# Patient Record
Sex: FEMALE | Race: WHITE | URBAN - METROPOLITAN AREA
[De-identification: names, ages, dates, MRNs, and addresses within clinical notes are randomized per-mention and may not be internally consistent; named-entity substitution may affect disease eponyms.]

---

## 2017-01-11 ENCOUNTER — OFFICE VISIT (OUTPATIENT)
Dept: INTERNAL MEDICINE | Facility: CLINIC | Age: 27
End: 2017-01-11
Payer: COMMERCIAL

## 2017-01-11 VITALS
DIASTOLIC BLOOD PRESSURE: 60 MMHG | HEIGHT: 64 IN | BODY MASS INDEX: 20.38 KG/M2 | OXYGEN SATURATION: 98 % | SYSTOLIC BLOOD PRESSURE: 100 MMHG | HEART RATE: 74 BPM | TEMPERATURE: 97.7 F | WEIGHT: 119.4 LBS

## 2017-01-11 DIAGNOSIS — G47.00 INSOMNIA, UNSPECIFIED TYPE: ICD-10-CM

## 2017-01-11 DIAGNOSIS — F41.9 ANXIETY: Primary | ICD-10-CM

## 2017-01-11 DIAGNOSIS — E03.9 HYPOTHYROIDISM, UNSPECIFIED TYPE: ICD-10-CM

## 2017-01-11 PROCEDURE — 99213 OFFICE O/P EST LOW 20 MIN: CPT | Performed by: INTERNAL MEDICINE

## 2017-01-11 RX ORDER — ESCITALOPRAM OXALATE 20 MG/1
20 TABLET ORAL DAILY
Qty: 90 TABLET | Refills: 3 | Status: CANCELLED | OUTPATIENT
Start: 2017-01-11

## 2017-01-11 RX ORDER — TRAZODONE HYDROCHLORIDE 50 MG/1
50-100 TABLET, FILM COATED ORAL
Qty: 60 TABLET | Refills: 3 | Status: SHIPPED | OUTPATIENT
Start: 2017-01-11 | End: 2017-06-27

## 2017-01-11 RX ORDER — ESCITALOPRAM OXALATE 20 MG/1
20 TABLET ORAL DAILY
Qty: 90 TABLET | Refills: 3 | Status: SHIPPED | OUTPATIENT
Start: 2017-01-11

## 2017-01-11 NOTE — PATIENT INSTRUCTIONS
Refills sent to pharmacy.    Thyroid labs when you've been on medication consistently for 1 month at least.

## 2017-01-11 NOTE — LETTER
[address] [address]  [phone]        January 16, 2018    Britt Navarro  [address]  [facility] [address]              Dear Britt Navarro    This is to remind you that your non-fasting labs is due.    You may call our office at [phone] to schedule an appointment.    Please disregard this notice if you have already had your labs drawn or made an appointment.        Sincerely,        Leilani Chavira MD

## 2017-01-11 NOTE — MR AVS SNAPSHOT
After Visit Summary   1/11/2017    Britt Navarro    MRN: [id number]           Patient Information     Date Of Birth          1990        Visit Information        Provider Department      1/11/2017 11:45 AM Leilani Chavira MD [facility]        Today's Diagnoses     Anxiety    -  1     Insomnia, unspecified type         Hypothyroidism, unspecified type           Care Instructions    Refills sent to pharmacy.    Thyroid labs when you've been on medication consistently for 1 month at least.         Follow-ups after your visit        Future tests that were ordered for you today     Open Future Orders        Priority Expected Expires Ordered    TSH Routine  1/11/2018 1/11/2017    T4 free Routine  1/11/2018 1/11/2017            Who to contact     If you have questions or need follow up information about today's clinic visit or your schedule please contact [facility] directly at [phone].  Normal or non-critical lab and imaging results will be communicated to you by MyChart, letter or phone within 4 business days after the clinic has received the results. If you do not hear from us within 7 days, please contact the clinic through [employer] or phone. If you have a critical or abnormal lab result, we will notify you by phone as soon as possible.  Submit refill requests through [employer] or call your pharmacy and they will forward the refill request to us. Please allow 3 business days for your refill to be completed.          Additional Information About Your Visit        MyChart Information     [employer] gives you secure access to your electronic health record. If you see a primary care provider, you can also send messages to your care team and make appointments. If you have questions, please call your primary care clinic.  If you do not have a primary care provider, please call [phone] and they will assist you.        Care EveryWhere ID      "This is your Care EveryWhere ID. This could be used by other organizations to access your [place] medical records  [id number]        Your Vitals Were     Pulse Temperature Height    74 97.7  F (36.5  C) (Oral) 5' 4\" (1.626 m)    BMI (Body Mass Index) Pulse Oximetry Last Period    20.48 kg/m2 98% 12/18/2016 (Exact Date)    Breastfeeding?          No         Blood Pressure from Last 3 Encounters:   01/11/17 100/60   10/05/16 118/60   08/17/16 110/70    Weight from Last 3 Encounters:   01/11/17 119 lb 6.4 oz (54.159 kg)   10/05/16 132 lb (59.875 kg)   08/17/16 136 lb 1.6 oz (61.735 kg)                 Today's Medication Changes          These changes are accurate as of: 1/11/17 12:00 PM.  If you have any questions, ask your nurse or doctor.               These medicines have changed or have updated prescriptions.        Dose/Directions    traZODone 50 MG tablet   Commonly known as:  DESYREL   This may have changed:  how much to take   Used for:  Insomnia, unspecified type   Changed by:  Leilani Chavira MD        Dose:   mg   Take 1-2 tablets ( mg) by mouth nightly as needed for sleep   Quantity:  60 tablet   Refills:  3            Where to get your medicines      These medications were sent to [facility] [address]  [address] [place] [address]     Phone:  [phone]    - escitalopram 20 MG tablet  - traZODone 50 MG tablet             Primary Care Provider Office Phone # Fax #    Leilani Chavira [phone]       [facility] [address]  [facility] [address]        Thank you!     Thank you for choosing [facility]  for your care. Our goal is always to provide you with excellent care. Hearing back from our patients is one way we can continue to improve our services. Please take a few minutes to complete the written survey that you may receive in the mail after your visit with us. Thank " you!             Your Updated Medication List - Protect others around you: Learn how to safely use, store and throw away your medicines at www.disposemymeds.org.          This list is accurate as of: 1/11/17 12:00 PM.  Always use your most recent med list.                   Brand Name Dispense Instructions for use    escitalopram 20 MG tablet    LEXAPRO    90 tablet    Take 1 tablet (20 mg) by mouth daily       LEVOTHYROXINE SODIUM PO      Take by mouth daily       traZODone 50 MG tablet    DESYREL    60 tablet    Take 1-2 tablets ( mg) by mouth nightly as needed for sleep       TRI-SPRINTEC 0.18/0.215/0.25 MG-35 MCG per tablet   Generic drug:  norgestim-eth estrad triphasic      Take 1 tablet by mouth daily

## 2017-01-11 NOTE — PROGRESS NOTES
"  SUBJECTIVE:                                                      HPI: Britt Navarro is a pleasant 26 year old female who presents for follow-up of anxiety and insomnia:    Re: anxiety:  - Lexapro dose was increased from 10 to 20mg daily at last visit ~5 weeks ago  - reports significant improvement since increase:   - able to handle stressors much better   - feeling much calmer, in general   - doing better, in general  - thankful for being on medication    Re: insomnia:  - has been using 1-2 tabs qhs PRN for insomnia  - has been using more nights than not  - works very well  - no excess grogginess    Needs refills of both medications.    Also due for thyroid follow-up:    HYPOTHYROID FOLLOW-UP                                                      Adherent to regimen: yes (but did run out for a couple weeks, a couple of weeks ago - prescription has since been renewed)  Taking on an empty stomach with sip of water - do food, drink, supplements, or medications for at least 45 minutes: yes    Cold or heat intolerance: denies  Weight gain or loss: denies  Hair loss or skin changes (dry or oily): denies  Excessive fatigue: denies  Constipation or diarrhea: denies  Mood changes (anxiety or depression): only improvement - as above    The medication, allergy, and problem lists have been reviewed and updated as appropriate.     OBJECTIVE:                                                      /60 mmHg  Pulse 74  Temp(Src) 97.7  F (36.5  C) (Oral)  Ht 5' 4\" (1.626 m)  Wt 119 lb 6.4 oz (54.159 kg)  BMI 20.48 kg/m2  SpO2 98%  LMP 12/18/2016 (Exact Date)  Breastfeeding? No  Constitutional: well-appearing  Psych: normal judgment and insight; normal mood and affect; recent and remote memory intact    ASSESSMENT/PLAN:                                                      (F41.9) Anxiety  (primary encounter diagnosis)  Comment: doing well on Lexapro 20mg daily.   Plan: CPM - refill provided.     (G47.00) Insomnia, " unspecified type  Comment: doing well on trazodone 50-100mg qhs PRN.   Plan:  CPM - refill provided.     (E03.9) Hypothyroidism, unspecified type  Comment: due for TFTs; seems to be doing well on current dose.   Plan: TFTs when she's been on medication consistently for at least 1 month.    The instructions on the AVS were discussed and explained to the patient. Patient expressed understanding of instructions.    Leilani Chavira MD   [address] [address]  T: [phone], F: [phone]

## 2017-01-29 ENCOUNTER — OFFICE VISIT (OUTPATIENT)
Dept: URGENT CARE | Facility: URGENT CARE | Age: 27
End: 2017-01-29
Payer: COMMERCIAL

## 2017-01-29 VITALS
OXYGEN SATURATION: 98 % | DIASTOLIC BLOOD PRESSURE: 54 MMHG | BODY MASS INDEX: 21.65 KG/M2 | TEMPERATURE: 98.4 F | WEIGHT: 126.2 LBS | SYSTOLIC BLOOD PRESSURE: 100 MMHG | HEART RATE: 68 BPM

## 2017-01-29 DIAGNOSIS — R82.90 NONSPECIFIC FINDING ON EXAMINATION OF URINE: ICD-10-CM

## 2017-01-29 DIAGNOSIS — R30.0 DYSURIA: Primary | ICD-10-CM

## 2017-01-29 LAB
BACTERIA #/AREA URNS HPF: ABNORMAL /HPF
RBC #/AREA URNS AUTO: ABNORMAL /HPF (ref 0–2)
WBC #/AREA URNS AUTO: ABNORMAL /HPF (ref 0–2)

## 2017-01-29 PROCEDURE — 87186 SC STD MICRODIL/AGAR DIL: CPT | Performed by: FAMILY MEDICINE

## 2017-01-29 PROCEDURE — 81015 MICROSCOPIC EXAM OF URINE: CPT | Performed by: PHYSICIAN ASSISTANT

## 2017-01-29 PROCEDURE — 99213 OFFICE O/P EST LOW 20 MIN: CPT | Performed by: FAMILY MEDICINE

## 2017-01-29 PROCEDURE — 87088 URINE BACTERIA CULTURE: CPT | Performed by: FAMILY MEDICINE

## 2017-01-29 PROCEDURE — 87086 URINE CULTURE/COLONY COUNT: CPT | Performed by: FAMILY MEDICINE

## 2017-01-29 RX ORDER — NITROFURANTOIN 25; 75 MG/1; MG/1
100 CAPSULE ORAL 2 TIMES DAILY
Qty: 14 CAPSULE | Refills: 0 | Status: SHIPPED
Start: 2017-01-29 | End: 2017-04-19

## 2017-01-29 NOTE — PROGRESS NOTES
SUBJECTIVE: Britt Navarro is a 26 year old female who complains of urinary frequency, urgency and dysuria x 4hrs , without flank pain, fever, chills, or abnormal vaginal discharge or bleeding.   She is currently having her cycles   She denies any abd pain or any acute flank pain    10 point ROS of systems including Constitutional, Eyes, Respiratory, Cardiovascular, Gastroenterology, , Integumentary, Muscularskeletal, Psychiatric were all negative except for pertinent positives noted in my HPI         OBJECTIVE: Appears well, in no apparent distress.  Vital signs are normal. The abdomen is soft without tenderness, guarding, mass, rebound or organomegaly. No CVA tenderness or inguinal adenopathy noted. Micro exam:  WBC's per HPFRBC 2-5.     ASSESSMENT: UTI uncomplicated without evidence of pyelonephritis  Britt was seen today for uti.    Diagnoses and all orders for this visit:    Dysuria  -     Cancel: UA with Microscopic reflex to Culture  -     Urine Microscopic  -     nitrofurantoin, macrocrystal-monohydrate, (MACROBID) 100 MG capsule; Take 1 capsule (100 mg) by mouth 2 times daily    Nonspecific finding on examination of urine  -     Urine Culture Aerobic Bacterial          PLAN: Treatment per orders - also push fluids, may use Pyridium OTC prn. Call or return to clinic prn if these symptoms worsen or fail to improve as anticipated.  Follow up if  symptoms fail to improve or worsens   Pt understood and agreed with plan

## 2017-01-29 NOTE — MR AVS SNAPSHOT
After Visit Summary   1/29/2017    Britt Navarro    MRN: [id number]           Patient Information     Date Of Birth          1990        Visit Information        Provider Department      1/29/2017 9:30 AM Kayleen Garcia MD [facility]        Today's Diagnoses     Dysuria    -  1     Nonspecific finding on examination of urine            Follow-ups after your visit        Who to contact     If you have questions or need follow up information about today's clinic visit or your schedule please contact [facility] directly at [phone].  Normal or non-critical lab and imaging results will be communicated to you by [employer], letter or phone within 4 business days after the clinic has received the results. If you do not hear from us within 7 days, please contact the clinic through [employer] or phone. If you have a critical or abnormal lab result, we will notify you by phone as soon as possible.  Submit refill requests through [employer] or call your pharmacy and they will forward the refill request to us. Please allow 3 business days for your refill to be completed.          Additional Information About Your Visit        MyChart Information     [employer] gives you secure access to your electronic health record. If you see a primary care provider, you can also send messages to your care team and make appointments. If you have questions, please call your primary care clinic.  If you do not have a primary care provider, please call [phone] and they will assist you.        Care EveryWhere ID     This is your Care EveryWhere ID. This could be used by other organizations to access your [place] medical records  [id number]        Your Vitals Were     Pulse Temperature Pulse Oximetry Last Period          68 98.4  F (36.9  C) (Oral) 98% 12/18/2016 (Exact Date)         Blood Pressure from Last 3 Encounters:   01/29/17 100/54   01/11/17 100/60    10/05/16 118/60    Weight from Last 3 Encounters:   01/29/17 126 lb 3.2 oz (57.244 kg)   01/11/17 119 lb 6.4 oz (54.159 kg)   10/05/16 132 lb (59.875 kg)              We Performed the Following     Urine Culture Aerobic Bacterial     Urine Microscopic          Today's Medication Changes          These changes are accurate as of: 1/29/17 10:19 AM.  If you have any questions, ask your nurse or doctor.               Start taking these medicines.        Dose/Directions    nitrofurantoin (macrocrystal-monohydrate) 100 MG capsule   Commonly known as:  MACROBID   Used for:  Dysuria   Started by:  Kayleen Garcia MD        Dose:  100 mg   Take 1 capsule (100 mg) by mouth 2 times daily   Quantity:  14 capsule   Refills:  0            Where to get your medicines      These medications were sent to [facility] [place] [address] AT [place] [address], [facility] [address]    Hours:  24-hours Phone:  [phone]    - nitrofurantoin (macrocrystal-monohydrate) 100 MG capsule             Primary Care Provider Office Phone # Fax #    Leilani Chavira [phone]       [facility] [address] [facility] [address]        Thank you!     Thank you for choosing [facility]  for your care. Our goal is always to provide you with excellent care. Hearing back from our patients is one way we can continue to improve our services. Please take a few minutes to complete the written survey that you may receive in the mail after your visit with us. Thank you!             Your Updated Medication List - Protect others around you: Learn how to safely use, store and throw away your medicines at www.disposemymeds.org.          This list is accurate as of: 1/29/17 10:19 AM.  Always use your most recent med list.                   Brand Name Dispense Instructions for use    escitalopram 20 MG tablet    LEXAPRO    90 tablet     Take 1 tablet (20 mg) by mouth daily       LEVOTHYROXINE SODIUM PO      Take by mouth daily       nitrofurantoin (macrocrystal-monohydrate) 100 MG capsule    MACROBID    14 capsule    Take 1 capsule (100 mg) by mouth 2 times daily       traZODone 50 MG tablet    DESYREL    60 tablet    Take 1-2 tablets ( mg) by mouth nightly as needed for sleep       TRI-SPRINTEC 0.18/0.215/0.25 MG-35 MCG per tablet   Generic drug:  norgestim-eth estrad triphasic      Take 1 tablet by mouth daily

## 2017-01-29 NOTE — NURSING NOTE
"Chief Complaint   Patient presents with     UTI     dysuria, took AZO 45 min ago       Initial /54 mmHg  Pulse 68  Temp(Src) 98.4  F (36.9  C) (Oral)  Wt 126 lb 3.2 oz (57.244 kg)  SpO2 98%  LMP 12/18/2016 (Exact Date) Estimated body mass index is 21.65 kg/(m^2) as calculated from the following:    Height as of 1/11/17: 5' 4\" (1.626 m).    Weight as of this encounter: 126 lb 3.2 oz (57.244 kg).  BP completed using cuff size: regular    "

## 2017-02-01 LAB
BACTERIA SPEC CULT: ABNORMAL
MICRO REPORT STATUS: ABNORMAL
MICROORGANISM SPEC CULT: ABNORMAL
MICROORGANISM SPEC CULT: ABNORMAL
SPECIMEN SOURCE: ABNORMAL

## 2017-04-19 ENCOUNTER — OFFICE VISIT (OUTPATIENT)
Dept: URGENT CARE | Facility: URGENT CARE | Age: 27
End: 2017-04-19
Payer: COMMERCIAL

## 2017-04-19 VITALS
HEART RATE: 70 BPM | WEIGHT: 125 LBS | TEMPERATURE: 98.4 F | SYSTOLIC BLOOD PRESSURE: 102 MMHG | BODY MASS INDEX: 21.46 KG/M2 | OXYGEN SATURATION: 100 % | DIASTOLIC BLOOD PRESSURE: 76 MMHG

## 2017-04-19 DIAGNOSIS — N39.0 ACUTE UTI: Primary | ICD-10-CM

## 2017-04-19 DIAGNOSIS — R30.0 DYSURIA: ICD-10-CM

## 2017-04-19 LAB
ALBUMIN UR-MCNC: NEGATIVE MG/DL
APPEARANCE UR: CLEAR
BACTERIA #/AREA URNS HPF: ABNORMAL /HPF
BILIRUB UR QL STRIP: NEGATIVE
COLOR UR AUTO: YELLOW
GLUCOSE UR STRIP-MCNC: NEGATIVE MG/DL
HGB UR QL STRIP: NEGATIVE
KETONES UR STRIP-MCNC: NEGATIVE MG/DL
LEUKOCYTE ESTERASE UR QL STRIP: NEGATIVE
NITRATE UR QL: NEGATIVE
PH UR STRIP: 6.5 PH (ref 5–7)
RBC #/AREA URNS AUTO: ABNORMAL /HPF (ref 0–2)
SP GR UR STRIP: 1.02 (ref 1–1.03)
URN SPEC COLLECT METH UR: ABNORMAL
UROBILINOGEN UR STRIP-ACNC: 0.2 EU/DL (ref 0.2–1)
WBC #/AREA URNS AUTO: ABNORMAL /HPF (ref 0–2)

## 2017-04-19 PROCEDURE — 99213 OFFICE O/P EST LOW 20 MIN: CPT | Performed by: PHYSICIAN ASSISTANT

## 2017-04-19 PROCEDURE — 81001 URINALYSIS AUTO W/SCOPE: CPT | Performed by: PHYSICIAN ASSISTANT

## 2017-04-19 PROCEDURE — 87186 SC STD MICRODIL/AGAR DIL: CPT | Performed by: PHYSICIAN ASSISTANT

## 2017-04-19 PROCEDURE — 87086 URINE CULTURE/COLONY COUNT: CPT | Performed by: PHYSICIAN ASSISTANT

## 2017-04-19 PROCEDURE — 87088 URINE BACTERIA CULTURE: CPT | Performed by: PHYSICIAN ASSISTANT

## 2017-04-19 RX ORDER — PHENAZOPYRIDINE HYDROCHLORIDE 100 MG/1
100 TABLET, FILM COATED ORAL 3 TIMES DAILY PRN
Qty: 12 TABLET | Refills: 0 | Status: SHIPPED | OUTPATIENT
Start: 2017-04-19

## 2017-04-19 RX ORDER — NITROFURANTOIN 25; 75 MG/1; MG/1
100 CAPSULE ORAL 2 TIMES DAILY
Qty: 14 CAPSULE | Refills: 0 | Status: SHIPPED | OUTPATIENT
Start: 2017-04-19

## 2017-04-19 NOTE — NURSING NOTE
"Chief Complaint   Patient presents with     Urinary Problem     dysuria,frequency today       Initial /76 (BP Location: Left arm, Patient Position: Chair, Cuff Size: Adult Regular)  Pulse 70  Temp 98.4  F (36.9  C) (Oral)  Wt 125 lb (56.7 kg)  SpO2 100%  BMI 21.46 kg/m2 Estimated body mass index is 21.46 kg/(m^2) as calculated from the following:    Height as of 1/11/17: 5' 4\" (1.626 m).    Weight as of this encounter: 125 lb (56.7 kg).  Medication Reconciliation: complete   Merlin LONG      "

## 2017-04-19 NOTE — PROGRESS NOTES
SUBJECTIVE:   Britt Navarro is a 26 year old female who  presents today for a possible UTI. Symptoms of dysuria, strong odor and urinary urgency and frequency have been going on for 1day(s).  Hematuria no.  sudden onsetand moderate.  There is no history of fever, chills, nausea or vomiting.  No history of vaginal discharge. This patient does have a history of urinary tract infections. Patient denies long duration, rigors, flank pain, temperature > 101 degrees F. and Vomiting, significant nausea or diarrhea.     SH: works here in [place], her parent's address is in [place], and her insurance is through [employer]    No past medical history on file.  Patient Active Problem List   Diagnosis     Anxiety     Insomnia, unspecified type     Social History   Substance Use Topics     Smoking status: Former Smoker     Types: Cigarettes     Smokeless tobacco: Never Used     Alcohol use No       ROS:   CONSTITUTIONAL:NEGATIVE for fever, chills, change in weight  : as per HPI    OBJECTIVE:  /76 (BP Location: Left arm, Patient Position: Chair, Cuff Size: Adult Regular)  Pulse 70  Temp 98.4  F (36.9  C) (Oral)  Wt 125 lb (56.7 kg)  SpO2 100%  BMI 21.46 kg/m2  GENERAL APPEARANCE: healthy, alert and no distress  ABDOMEN:  soft, nontender, no HSM or masses and bowel sounds normal  BACK: No CVA tenderness  SKIN: no suspicious lesions or rashes    ASSESSMENT:   Lower, uncomplicated urinary tract infection.    PLAN:  MACROBID  PYRIDIUM  As per ordered above  Drink plenty of fluids.  Prevention and treatment of UTI's discussed.Signs and symptoms of pyelonephritis mentioned.  Follow up with primary care physician if not improving

## 2017-04-19 NOTE — MR AVS SNAPSHOT
After Visit Summary   4/19/2017    Britt Navarro    MRN: [id number]           Patient Information     Date Of Birth          1990        Visit Information        Provider Department      4/19/2017 4:30 PM Cindy Slade PA-C [facility]        Today's Diagnoses     Acute UTI    -  1    Dysuria           Follow-ups after your visit        Who to contact     If you have questions or need follow up information about today's clinic visit or your schedule please contact [facility] directly at [phone].  Normal or non-critical lab and imaging results will be communicated to you by [employer], letter or phone within 4 business days after the clinic has received the results. If you do not hear from us within 7 days, please contact the clinic through [employer] or phone. If you have a critical or abnormal lab result, we will notify you by phone as soon as possible.  Submit refill requests through [employer] or call your pharmacy and they will forward the refill request to us. Please allow 3 business days for your refill to be completed.          Additional Information About Your Visit        MyChart Information     [employer] gives you secure access to your electronic health record. If you see a primary care provider, you can also send messages to your care team and make appointments. If you have questions, please call your primary care clinic.  If you do not have a primary care provider, please call [phone] and they will assist you.        Care EveryWhere ID     This is your Care EveryWhere ID. This could be used by other organizations to access your [place] medical records  [id number]        Your Vitals Were     Pulse Temperature Pulse Oximetry BMI (Body Mass Index)          70 98.4  F (36.9  C) (Oral) 100% 21.46 kg/m2         Blood Pressure from Last 3 Encounters:   04/19/17 102/76   01/29/17 100/54   01/11/17 100/60    Weight  from Last 3 Encounters:   04/19/17 125 lb (56.7 kg)   01/29/17 126 lb 3.2 oz (57.2 kg)   01/11/17 119 lb 6.4 oz (54.2 kg)              We Performed the Following     UA with Microscopic reflex to Culture     Urine Culture Aerobic Bacterial          Today's Medication Changes          These changes are accurate as of: 4/19/17  5:42 PM.  If you have any questions, ask your nurse or doctor.               Start taking these medicines.        Dose/Directions    nitrofurantoin (macrocrystal-monohydrate) 100 MG capsule   Commonly known as:  MACROBID   Used for:  Acute UTI   Started by:  Cindy Slade PA-C        Dose:  100 mg   Take 1 capsule (100 mg) by mouth 2 times daily   Quantity:  14 capsule   Refills:  0       phenazopyridine 100 MG tablet   Commonly known as:  PYRIDIUM   Used for:  Acute UTI   Started by:  Cindy Slade PA-C        Dose:  100 mg   Take 1 tablet (100 mg) by mouth 3 times daily as needed   Quantity:  12 tablet   Refills:  0            Where to get your medicines      These medications were sent to [facility] [address] [place] [address] [address] AT [facility] [address], [facility] [address]    Hours:  24-hours Phone:  [phone]     nitrofurantoin (macrocrystal-monohydrate) 100 MG capsule    phenazopyridine 100 MG tablet                Primary Care Provider Office Phone # Fax #    Leilani Chavira [phone]       [facility] [address] [facility] [address]        Thank you!     Thank you for choosing [facility]  for your care. Our goal is always to provide you with excellent care. Hearing back from our patients is one way we can continue to improve our services. Please take a few minutes to complete the written survey that you may receive in the mail after your visit with us. Thank you!             Your Updated Medication List - Protect others around you: Learn  how to safely use, store and throw away your medicines at www.disposemymeds.org.          This list is accurate as of: 4/19/17  5:42 PM.  Always use your most recent med list.                   Brand Name Dispense Instructions for use    escitalopram 20 MG tablet    LEXAPRO    90 tablet    Take 1 tablet (20 mg) by mouth daily       LEVOTHYROXINE SODIUM PO      Take by mouth daily       nitrofurantoin (macrocrystal-monohydrate) 100 MG capsule    MACROBID    14 capsule    Take 1 capsule (100 mg) by mouth 2 times daily       phenazopyridine 100 MG tablet    PYRIDIUM    12 tablet    Take 1 tablet (100 mg) by mouth 3 times daily as needed       traZODone 50 MG tablet    DESYREL    60 tablet    Take 1-2 tablets ( mg) by mouth nightly as needed for sleep       TRI-SPRINTEC 0.18/0.215/0.25 MG-35 MCG per tablet   Generic drug:  norgestim-eth estrad triphasic      Take 1 tablet by mouth daily

## 2017-04-21 LAB
BACTERIA SPEC CULT: ABNORMAL
MICRO REPORT STATUS: ABNORMAL
MICROORGANISM SPEC CULT: ABNORMAL
SPECIMEN SOURCE: ABNORMAL

## 2017-06-27 DIAGNOSIS — G47.00 INSOMNIA, UNSPECIFIED TYPE: ICD-10-CM

## 2017-06-27 RX ORDER — TRAZODONE HYDROCHLORIDE 50 MG/1
50-100 TABLET, FILM COATED ORAL
Qty: 60 TABLET | Refills: 3 | Status: SHIPPED | OUTPATIENT
Start: 2017-06-27

## 2017-06-27 NOTE — TELEPHONE ENCOUNTER
trazodone      Last Written Prescription Date: 1/11/17  Last Fill Quantity: 60  # refills: 3  Last Office Visit with [facility], [facility] or [facility] prescribing provider: 1/29/17

## 2018-01-07 ENCOUNTER — HEALTH MAINTENANCE LETTER (OUTPATIENT)
Age: 28
End: 2018-01-07

## 2018-02-21 ENCOUNTER — TELEPHONE (OUTPATIENT)
Dept: LAB | Facility: CLINIC | Age: 28
End: 2018-02-21

## 2018-07-20 ENCOUNTER — OFFICE VISIT (OUTPATIENT)
Dept: URGENT CARE | Facility: URGENT CARE | Age: 28
End: 2018-07-20
Payer: COMMERCIAL

## 2018-07-20 VITALS
TEMPERATURE: 99 F | SYSTOLIC BLOOD PRESSURE: 110 MMHG | DIASTOLIC BLOOD PRESSURE: 70 MMHG | WEIGHT: 124.31 LBS | BODY MASS INDEX: 21.34 KG/M2 | HEART RATE: 76 BPM | RESPIRATION RATE: 16 BRPM

## 2018-07-20 DIAGNOSIS — R30.0 DYSURIA: Primary | ICD-10-CM

## 2018-07-20 DIAGNOSIS — R82.90 NONSPECIFIC FINDING ON EXAMINATION OF URINE: ICD-10-CM

## 2018-07-20 LAB
ALBUMIN UR-MCNC: NEGATIVE MG/DL
APPEARANCE UR: CLEAR
BACTERIA #/AREA URNS HPF: ABNORMAL /HPF
BILIRUB UR QL STRIP: NEGATIVE
COLOR UR AUTO: YELLOW
GLUCOSE UR STRIP-MCNC: NEGATIVE MG/DL
HGB UR QL STRIP: NEGATIVE
KETONES UR STRIP-MCNC: NEGATIVE MG/DL
LEUKOCYTE ESTERASE UR QL STRIP: NEGATIVE
NITRATE UR QL: POSITIVE
NON-SQ EPI CELLS #/AREA URNS LPF: ABNORMAL /LPF
PH UR STRIP: 6 PH (ref 5–7)
RBC #/AREA URNS AUTO: ABNORMAL /HPF
SOURCE: ABNORMAL
SP GR UR STRIP: 1.02 (ref 1–1.03)
UROBILINOGEN UR STRIP-ACNC: 0.2 EU/DL (ref 0.2–1)
WBC #/AREA URNS AUTO: ABNORMAL /HPF

## 2018-07-20 PROCEDURE — 87086 URINE CULTURE/COLONY COUNT: CPT | Performed by: FAMILY MEDICINE

## 2018-07-20 PROCEDURE — 81001 URINALYSIS AUTO W/SCOPE: CPT | Performed by: FAMILY MEDICINE

## 2018-07-20 PROCEDURE — 99213 OFFICE O/P EST LOW 20 MIN: CPT | Performed by: FAMILY MEDICINE

## 2018-07-20 RX ORDER — CIPROFLOXACIN 500 MG/1
500 TABLET, FILM COATED ORAL 2 TIMES DAILY
Qty: 14 TABLET | Refills: 0 | Status: SHIPPED | OUTPATIENT
Start: 2018-07-20 | End: 2018-07-27

## 2018-07-20 RX ORDER — COPPER 313.4 MG/1
1 INTRAUTERINE DEVICE INTRAUTERINE ONCE
COMMUNITY

## 2018-07-20 RX ORDER — BUSPIRONE HYDROCHLORIDE 5 MG/1
TABLET ORAL
Refills: 10 | COMMUNITY
Start: 2018-01-27

## 2018-07-20 NOTE — MR AVS SNAPSHOT
After Visit Summary   7/20/2018    Britt Navarro    MRN: [id number]           Patient Information     Date Of Birth          1990        Visit Information        Provider Department      7/20/2018 2:25 PM Kayleen Garcia MD [facility]        Today's Diagnoses     Dysuria    -  1    Nonspecific finding on examination of urine           Follow-ups after your visit        Who to contact     If you have questions or need follow up information about today's clinic visit or your schedule please contact [facility] [place] directly at [phone].  Normal or non-critical lab and imaging results will be communicated to you by [employer], letter or phone within 4 business days after the clinic has received the results. If you do not hear from us within 7 days, please contact the clinic through [employer] or phone. If you have a critical or abnormal lab result, we will notify you by phone as soon as possible.  Submit refill requests through [employer] or call your pharmacy and they will forward the refill request to us. Please allow 3 business days for your refill to be completed.          Additional Information About Your Visit        MyChart Information     [employer] gives you secure access to your electronic health record. If you see a primary care provider, you can also send messages to your care team and make appointments. If you have questions, please call your primary care clinic.  If you do not have a primary care provider, please call [phone] and they will assist you.        Care EveryWhere ID     This is your Care EveryWhere ID. This could be used by other organizations to access your [facility] medical records  [id number]        Your Vitals Were     Pulse Temperature Respirations BMI (Body Mass Index)          76 99  F (37.2  C) (Oral) 16 21.34 kg/m2         Blood Pressure from Last 3 Encounters:   07/20/18 110/70   04/19/17 102/76   01/29/17  100/54    Weight from Last 3 Encounters:   07/20/18 124 lb 5 oz (56.4 kg)   04/19/17 125 lb (56.7 kg)   01/29/17 126 lb 3.2 oz (57.2 kg)              We Performed the Following     UA with Microscopic reflex to Culture     Urine Culture Aerobic Bacterial          Today's Medication Changes          These changes are accurate as of 7/20/18  4:05 PM.  If you have any questions, ask your nurse or doctor.               Start taking these medicines.        Dose/Directions    ciprofloxacin 500 MG tablet   Commonly known as:  CIPRO   Used for:  Dysuria   Started by:  aKyleen Garcia MD        Dose:  500 mg   Take 1 tablet (500 mg) by mouth 2 times daily for 7 days   Quantity:  14 tablet   Refills:  0            Where to get your medicines      These medications were sent to [facility] [address] [place] [address] - [address] AT [facility] [place]  [address], [facility] [address]     Phone:  [phone]     ciprofloxacin 500 MG tablet                Primary Care Provider Office Phone # Fax #    Leilani Chavira [phone]       [address] [facility] [address]        Equal Access to Services     CATHY ARCHER AH: Hadii jami ku hadasho Soomaali, waaxda luqadaha, qaybta kaalmada adeegyada, cinda espino hayoneal crawford. So [facility] [phone].    ATENCIÓN: Si habla español, tiene a parks disposición servicios gratuitos de asistencia lingüística. Llame al [phone].    We comply with applicable federal civil rights laws and [place] laws. We do not discriminate on the basis of race, color, national origin, age, disability, sex, sexual orientation, or gender identity.            Thank you!     Thank you for choosing [facility]  for your care. Our goal is always to provide you with excellent care. Hearing back from our patients is one way we can continue to improve our services. Please take a few minutes to complete the  written survey that you may receive in the mail after your visit with us. Thank you!             Your Updated Medication List - Protect others around you: Learn how to safely use, store and throw away your medicines at www.disposemymeds.org.          This list is accurate as of 7/20/18  4:05 PM.  Always use your most recent med list.                   Brand Name Dispense Instructions for use Diagnosis    busPIRone 5 MG tablet    BUSPAR          ciprofloxacin 500 MG tablet    CIPRO    14 tablet    Take 1 tablet (500 mg) by mouth 2 times daily for 7 days    Dysuria       escitalopram 20 MG tablet    LEXAPRO    90 tablet    Take 1 tablet (20 mg) by mouth daily    Anxiety       LEVOTHYROXINE SODIUM PO      Take by mouth daily        nitroFURantoin (macrocrystal-monohydrate) 100 MG capsule    MACROBID    14 capsule    Take 1 capsule (100 mg) by mouth 2 times daily    Acute UTI       paragard intrauterine copper      1 each by Intrauterine route once        phenazopyridine 100 MG tablet    PYRIDIUM    12 tablet    Take 1 tablet (100 mg) by mouth 3 times daily as needed    Acute UTI       traZODone 50 MG tablet    DESYREL    60 tablet    Take 1-2 tablets ( mg) by mouth nightly as needed for sleep    Insomnia, unspecified type       TRI-SPRINTEC 0.18/0.215/0.25 MG-35 MCG per tablet   Generic drug:  norgestim-eth estrad triphasic      Take 1 tablet by mouth daily

## 2018-07-20 NOTE — PROGRESS NOTES
Chief Complaint   Patient presents with     Urinary Problem     fould smelling urine started last night,has long hx of UTI         SUBJECTIVE:   Britt Navarro is a 27 year old female who  presents today for a possible UTI. Symptoms of dysuria and frequency have been going on for 1day(s).  Hematuria yes .  sudden onsetand moderate.  There is no history of fever, chills, nausea or vomiting.  No history of vaginal  discharge. This patient does  have a history of urinary tract infections. Patient denies rigors, flank pain, temperature > 101 degrees F. and Vomiting, significant nausea or diarrhea or vaginal discharge   She just started her cycle    No past medical history on file.  Current Outpatient Prescriptions   Medication Sig Dispense Refill     busPIRone (BUSPAR) 5 MG tablet   10     escitalopram (LEXAPRO) 20 MG tablet Take 1 tablet (20 mg) by mouth daily 90 tablet 3     LEVOTHYROXINE SODIUM PO Take by mouth daily       nitrofurantoin, macrocrystal-monohydrate, (MACROBID) 100 MG capsule Take 1 capsule (100 mg) by mouth 2 times daily (Patient not taking: Reported on 7/20/2018) 14 capsule 0     norgestim-eth estrad triphasic (TRI-SPRINTEC) 0.18/0.215/0.25 MG-35 MCG per tablet Take 1 tablet by mouth daily       paragard intrauterine copper 1 each by Intrauterine route once       phenazopyridine (PYRIDIUM) 100 MG tablet Take 1 tablet (100 mg) by mouth 3 times daily as needed (Patient not taking: Reported on 7/20/2018) 12 tablet 0     traZODone (DESYREL) 50 MG tablet Take 1-2 tablets ( mg) by mouth nightly as needed for sleep 60 tablet 3     Social History   Substance Use Topics     Smoking status: Former Smoker     Types: Cigarettes     Smokeless tobacco: Never Used     Alcohol use No       ROS:   Review of systems negative except as stated above.    OBJECTIVE:  /70 (Cuff Size: Adult Regular)  Pulse 76  Temp 99  F (37.2  C) (Oral)  Resp 16  Wt 124 lb 5 oz (56.4 kg)  BMI 21.34 kg/m2  GENERAL  APPEARANCE: healthy, alert and no distress  RESP: lungs clear to auscultation - no rales, rhonchi or wheezes  CV: regular rates and rhythm, normal S1 S2, no murmur noted  ABDOMEN:  soft, nontender, no HSM or masses and bowel sounds normal  BACK: No CVA tenderness  SKIN: no suspicious lesions or rashes    ASSESSMENT:   Lower, uncomplicated urinary tract infection.  Britt was seen today for urinary problem.    Diagnoses and all orders for this visit:    Dysuria  -     UA with Microscopic reflex to Culture    Nonspecific finding on examination of urine  -     Urine Culture Aerobic Bacterial          PLAN:  As per ordered above  Drink plenty of fluids.  Prevention and treatment of UTI's discussed.Signs and symptoms of pyelonephritis mentioned.  Follow up with primary care physician if not improving

## 2018-07-20 NOTE — LETTER
[facility] [address] [address]  Phone: [phone]    07/20/18    Britt Hall Ramon  [address] [place] [id number]      To whom it may concern:     Brittkhari Hall Navarro was seen in the clinic today,she is ok to get back to work .    Sincerely,      Kayleen Garcia MD

## 2018-07-21 LAB
BACTERIA SPEC CULT: NORMAL
BACTERIA SPEC CULT: NORMAL
SPECIMEN SOURCE: NORMAL

## 2020-03-10 ENCOUNTER — HEALTH MAINTENANCE LETTER (OUTPATIENT)
Age: 30
End: 2020-03-10